# Patient Record
Sex: MALE | Race: WHITE
[De-identification: names, ages, dates, MRNs, and addresses within clinical notes are randomized per-mention and may not be internally consistent; named-entity substitution may affect disease eponyms.]

---

## 2019-07-31 ENCOUNTER — HOSPITAL ENCOUNTER (EMERGENCY)
Dept: HOSPITAL 10 - FTE | Age: 24
Discharge: HOME | End: 2019-07-31
Payer: COMMERCIAL

## 2019-07-31 ENCOUNTER — HOSPITAL ENCOUNTER (EMERGENCY)
Dept: HOSPITAL 91 - FTE | Age: 24
Discharge: HOME | End: 2019-07-31
Payer: COMMERCIAL

## 2019-07-31 VITALS
HEIGHT: 67 IN | HEIGHT: 67 IN | WEIGHT: 240.74 LBS | BODY MASS INDEX: 37.79 KG/M2 | WEIGHT: 240.74 LBS | BODY MASS INDEX: 37.79 KG/M2

## 2019-07-31 VITALS — SYSTOLIC BLOOD PRESSURE: 140 MMHG | HEART RATE: 73 BPM | RESPIRATION RATE: 20 BRPM | DIASTOLIC BLOOD PRESSURE: 78 MMHG

## 2019-07-31 DIAGNOSIS — S31.159A: Primary | ICD-10-CM
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
## 2019-07-31 PROCEDURE — 99283 EMERGENCY DEPT VISIT LOW MDM: CPT

## 2019-07-31 NOTE — ERD
ER Documentation


Chief Complaint


Chief Complaint





dog bite





HPI


24-year-old male presenting with a dog bite to the abdomen.  Patient sustained a


bite from a mysterious dog yesterday.  Patient does not know this dog.  Has not 


taken any medications for the symptoms and has been cleaning the area with soap 


and water.  Denies medical problems.  NKDA.  Surgical history denies.  Smokes 


weed daily





ROS


All systems reviewed and are negative except as per history of present illness.





Medications


Home Meds


Active Scripts


Amoxicillin/Potassium Clav (Amox-Clav 875-125 mg Tablet) 875-125 mg Tab, 1 TAB 


PO BID for 7 Days, #14 TAB


   Prov:SUZY MOONEY PA-C         7/31/19





Allergies


Allergies:  


Coded Allergies:  


     No Known Allergy (Unverified , 7/31/19)





PMhx/Soc


Medical and Surgical Hx:  pt denies Medical Hx, pt denies Surgical Hx


Hx Alcohol Use:  No


Hx Substance Use:  Yes


Hx Tobacco Use:  No





FmHx


Family History:  No diabetes, No coronary disease, No other





Physical Exam


Vitals





Vital Signs


  Date      Temp  Pulse  Resp  B/P (MAP)   Pulse Ox  O2          O2 Flow    FiO2


Time                                                 Delivery    Rate


   7/31/19  99.2     73    20      140/78        98


     12:48                           (98)





Physical Exam


GENERAL: The patient is well-appearing, well-nourished, in no acute distress


CHEST: Clear to auscultation bilaterally.  There are no rales, wheezes or 


rhonchi.


HEART: Regular rate and rhythm.  No murmurs, clicks, rubs or gallops. 


ABDOMEN:Soft, nontender and nondistended.  Good bowel sounds.  No rebound or 


guarding.  No gross peritonitis.  No gross organomegaly or masses.  


SKIN: Bruising noted to the abdomen.  Superficial abrasion noted to the abdomen.


 Small puncture wound.  No surrounding erythema or fluctuance.





Procedures/MDM


MDM: 24-year-old male presenting with dog bite.  I have low suspicion for deep 


tracking infection.  I will cover with antibiotics given this is dog bite but is


puncture wound and does not require sutures at this time.  Patient is discharged


with strict ER precautions and told to follow-up with primary care within 1 to 2


days for close evaluation.  Patient is told symptoms change or worsen to return 


immediately to the ER.  All questions answered at discharge





Departure


Diagnosis:  


   Primary Impression:  


   Dog bite


Condition:  Stable


Patient Instructions:  Dog Bite


Referrals:  


[facility]


YOU HAVE RECEIVED A MEDICAL SCREENING EXAM AND THE RESULTS INDICATE THAT YOU DO 


NOT HAVE A CONDITION THAT REQUIRES URGENT TREATMENT IN THE EMERGENCY DEPARTMENT.





FURTHER EVALUATION AND TREATMENT OF YOUR CONDITION CAN WAIT UNTIL YOU ARE SEEN 


IN YOUR DOCTORS OFFICE WITHIN THE NEXT 1-2 DAYS. IT IS YOUR RESPONSIBILITY TO 


MAKE AN APPOINTMENT FOR FOLOW-UP CARE.





IF YOU HAVE A PRIMARY DOCTOR


--you should call your primary doctor and schedule an appointment





IF YOU DO NOT HAVE A PRIMARY DOCTOR YOU CAN CALL OUR PHYSICIAN REFERRAL HOTLINE 


AT


 [phone] 





IF YOU CAN NOT AFFORD TO SEE A PHYSICIAN YOU CAN CHOSE FROM THE FOLLOWING 


[facility] [phone] [address] [facility] [phone] [address] [facility]. [facility] [phone] [address] [facility] [phone] [address] [facility] [phone] [address] [facility]. [facility]. [phone]


[address]





Additional Instructions:  


FOLLOW UP WITH YOUR PRIMARY CARE PHYSICIAN TOMORROW.Return to this facility if 


you are not improving as expected.











SUZY MOONEY PA-C       Jul 31, 2019 13:55